# Patient Record
Sex: FEMALE | Race: WHITE | Employment: PART TIME | URBAN - METROPOLITAN AREA
[De-identification: names, ages, dates, MRNs, and addresses within clinical notes are randomized per-mention and may not be internally consistent; named-entity substitution may affect disease eponyms.]

---

## 2020-05-11 ENCOUNTER — OFFICE VISIT (OUTPATIENT)
Dept: OBGYN CLINIC | Age: 25
End: 2020-05-11
Payer: COMMERCIAL

## 2020-05-11 ENCOUNTER — HOSPITAL ENCOUNTER (OUTPATIENT)
Age: 25
Setting detail: SPECIMEN
Discharge: HOME | End: 2020-05-11
Payer: COMMERCIAL

## 2020-05-11 VITALS
SYSTOLIC BLOOD PRESSURE: 116 MMHG | WEIGHT: 189 LBS | HEIGHT: 66 IN | BODY MASS INDEX: 30.37 KG/M2 | DIASTOLIC BLOOD PRESSURE: 74 MMHG

## 2020-05-11 PROCEDURE — 99395 PREV VISIT EST AGE 18-39: CPT | Performed by: OBSTETRICS & GYNECOLOGY

## 2020-05-11 ASSESSMENT — ENCOUNTER SYMPTOMS
COUGH: 0
SHORTNESS OF BREATH: 0
ABDOMINAL PAIN: 0
DIARRHEA: 0
BACK PAIN: 0
ABDOMINAL DISTENTION: 0
CONSTIPATION: 0

## 2020-05-11 NOTE — PROGRESS NOTES
Systems   Constitutional: Negative for appetite change and fatigue. HENT: Negative for congestion and hearing loss. Eyes: Negative for visual disturbance. Respiratory: Negative for cough and shortness of breath. Cardiovascular: Negative for chest pain and palpitations. Gastrointestinal: Negative for abdominal distention, abdominal pain, constipation and diarrhea. Genitourinary: Negative for flank pain, frequency, menstrual problem, pelvic pain and vaginal discharge. Musculoskeletal: Negative for back pain. Neurological: Negative for syncope and headaches. Psychiatric/Behavioral: Negative for behavioral problems. Objective:   Physical Exam  Constitutional:       Appearance: She is well-developed. Eyes:      Pupils: Pupils are equal, round, and reactive to light. Neck:      Thyroid: No thyromegaly. Trachea: No tracheal deviation. Cardiovascular:      Rate and Rhythm: Normal rate and regular rhythm. Heart sounds: Normal heart sounds. Pulmonary:      Effort: Pulmonary effort is normal. No respiratory distress. Breath sounds: Normal breath sounds. Chest:      Breasts: Breasts are symmetrical.         Right: No inverted nipple. Left: No inverted nipple, mass, nipple discharge, skin change or tenderness. Abdominal:      General: Bowel sounds are normal. There is no distension. Palpations: Abdomen is soft. There is no mass. Tenderness: There is no abdominal tenderness. Hernia: There is no hernia in the right inguinal area or left inguinal area. Genitourinary:     Labia:         Right: No rash or lesion. Left: No rash or lesion. Vagina: No vaginal discharge or tenderness. Cervix: No cervical motion tenderness, discharge or friability. Uterus: Not deviated, not enlarged and not fixed. Adnexa:         Right: No mass, tenderness or fullness. Left: No mass, tenderness or fullness.      Musculoskeletal:

## 2020-05-18 LAB — CYTOLOGY REPORT: NORMAL

## 2021-06-07 ENCOUNTER — PROCEDURE VISIT (OUTPATIENT)
Dept: OBGYN CLINIC | Age: 26
End: 2021-06-07
Payer: COMMERCIAL

## 2021-06-07 VITALS
SYSTOLIC BLOOD PRESSURE: 124 MMHG | WEIGHT: 190.8 LBS | HEIGHT: 66 IN | BODY MASS INDEX: 30.67 KG/M2 | DIASTOLIC BLOOD PRESSURE: 84 MMHG

## 2021-06-07 DIAGNOSIS — Z30.432 ENCOUNTER FOR IUD REMOVAL: Primary | ICD-10-CM

## 2021-06-07 DIAGNOSIS — Z31.69 ENCOUNTER FOR PRECONCEPTION CONSULTATION: ICD-10-CM

## 2021-06-07 PROCEDURE — 58301 REMOVE INTRAUTERINE DEVICE: CPT | Performed by: OBSTETRICS & GYNECOLOGY

## 2021-06-07 PROCEDURE — 99999 PR OFFICE/OUTPT VISIT,PROCEDURE ONLY: CPT | Performed by: OBSTETRICS & GYNECOLOGY

## 2021-06-07 ASSESSMENT — ENCOUNTER SYMPTOMS
NAUSEA: 0
ABDOMINAL PAIN: 0
CONSTIPATION: 0
COUGH: 0
DIARRHEA: 0
VOMITING: 0
WHEEZING: 0

## 2021-06-07 NOTE — PROGRESS NOTES
[facility], [address] [place] [address]  DATE OF VISIT:  21    Jenni Hodge    :  1995  CHIEF COMPLAINT:    Chief Complaint   Patient presents with    Procedure     IUD Removal- Just got  5/15 and ready for pregnancy        HPI :   Jenni Hodge is a 22 y.o. female here for IUD removal. She was recently  and would like to attempt pregnancy. 1 normal pregnancy 6 years ago. No current medications. Not taking a prenatal. Cycles are regular on the IUD, LMP 21. Past Medical History:   Diagnosis Date    Blood donor     abnormal screen for T. Cruzi      Past Surgical History:   Procedure Laterality Date    INTRAUTERINE DEVICE INSERTION  07/29/15    mirena     Family History   Problem Relation Age of Onset    Diabetes Father     Cancer Paternal Grandmother         lung    Cancer Paternal Grandfather      Social History     Tobacco Use   Smoking Status Never Smoker   Smokeless Tobacco Never Used     Social History     Substance and Sexual Activity   Alcohol Use No     Current Outpatient Medications   Medication Sig Dispense Refill    levonorgestrel (MIRENA) 20 MCG/24HR IUD 1 each by Intrauterine route once       No current facility-administered medications for this visit. Allergies:  Patient has no known allergies. Gynecologic History:  Patient's last menstrual period was 2021. Sexually Active: Yes  STD History: No      OB History    Para Term  AB Living   1 1 0 0 0 1   SAB TAB Ectopic Molar Multiple Live Births   0 0 0 0 0 1       Review of Systems   Constitutional: Negative for chills and fever. HENT: Negative for hearing loss. Respiratory: Negative for cough and wheezing. Cardiovascular: Negative for chest pain and palpitations. Gastrointestinal: Negative for abdominal pain, constipation, diarrhea, nausea and vomiting.    Genitourinary: Negative for decreased urine volume, difficulty urinating, dyspareunia, dysuria, enuresis, flank pain, frequency, genital sores, hematuria, menstrual problem, pelvic pain, urgency, vaginal bleeding, vaginal discharge and vaginal pain. Musculoskeletal: Negative for myalgias. Skin: Negative for rash. Neurological: Negative for dizziness, weakness and headaches. Hematological: Does not bruise/bleed easily. Psychiatric/Behavioral: Negative for suicidal ideas. /84 (Position: Sitting, Cuff Size: Medium Adult)   Ht 5' 6.25\" (1.683 m)   Wt 190 lb 12.8 oz (86.5 kg)   LMP 05/24/2021   BMI 30.56 kg/m²     Physical Exam  Constitutional:       Appearance: She is well-developed. HENT:      Head: Normocephalic. Neck:      Thyroid: No thyromegaly. Cardiovascular:      Rate and Rhythm: Normal rate and regular rhythm. Pulmonary:      Effort: Pulmonary effort is normal. No respiratory distress. Abdominal:      General: There is no distension. Palpations: Abdomen is soft. Tenderness: There is no abdominal tenderness. Genitourinary:     Labia:         Right: No rash, tenderness or lesion. Left: No rash, tenderness, lesion or injury. Cervix: No cervical motion tenderness, discharge, friability, lesion, erythema, cervical bleeding or eversion. Comments: IUD strings x 2 visualized per os. Grasped with Gisela clamp and removed easily, intact. It was shown to patient. She tolerated it well. Musculoskeletal:         General: Normal range of motion. Cervical back: Normal range of motion. Skin:     General: Skin is warm and dry. Neurological:      Mental Status: She is alert and oriented to person, place, and time. Psychiatric:         Behavior: Behavior normal.         Thought Content: Thought content normal.         Judgment: Judgment normal.         ASSESSMENT:  Farhana Villafana is a 22 y.o. female      ICD-10-CM    1. Encounter for IUD removal  Z30.432 [phone] - ME REMOVE INTRAUTERINE DEVICE   2.  Encounter for preconception consultation Z31.69        PLAN:    Advised starting a prenatal vitamin (or folic acid supplement).  Return to office for annual.     Electronically signed by Romayne Ferretti, MD on 6/7/2021 at 1:26 PM

## 2021-10-19 ENCOUNTER — OFFICE VISIT (OUTPATIENT)
Dept: PRIMARY CARE CLINIC | Age: 26
End: 2021-10-19
Payer: COMMERCIAL

## 2021-10-19 VITALS
SYSTOLIC BLOOD PRESSURE: 122 MMHG | WEIGHT: 189 LBS | OXYGEN SATURATION: 98 % | DIASTOLIC BLOOD PRESSURE: 78 MMHG | BODY MASS INDEX: 30.37 KG/M2 | HEART RATE: 90 BPM | HEIGHT: 66 IN

## 2021-10-19 DIAGNOSIS — L70.0 ACNE VULGARIS: ICD-10-CM

## 2021-10-19 DIAGNOSIS — Z00.00 HEALTH CARE MAINTENANCE: Primary | ICD-10-CM

## 2021-10-19 DIAGNOSIS — Z00.00 HEALTH CARE MAINTENANCE: ICD-10-CM

## 2021-10-19 LAB
ALBUMIN SERPL-MCNC: 4.6 G/DL (ref 3.5–5.2)
ALBUMIN/GLOBULIN RATIO: 1.5 (ref 1–2.5)
ALP BLD-CCNC: 57 U/L (ref 35–104)
ALT SERPL-CCNC: 12 U/L (ref 5–33)
ANION GAP SERPL CALCULATED.3IONS-SCNC: 12 MMOL/L (ref 9–17)
AST SERPL-CCNC: 15 U/L
BILIRUB SERPL-MCNC: 0.35 MG/DL (ref 0.3–1.2)
BUN BLDV-MCNC: 10 MG/DL (ref 6–20)
BUN/CREAT BLD: NORMAL (ref 9–20)
CALCIUM SERPL-MCNC: 8.8 MG/DL (ref 8.6–10.4)
CHLORIDE BLD-SCNC: 104 MMOL/L (ref 98–107)
CHOLESTEROL/HDL RATIO: 2
CHOLESTEROL: 138 MG/DL
CO2: 23 MMOL/L (ref 20–31)
CREAT SERPL-MCNC: 0.75 MG/DL (ref 0.5–0.9)
GFR AFRICAN AMERICAN: >60 ML/MIN
GFR NON-AFRICAN AMERICAN: >60 ML/MIN
GFR SERPL CREATININE-BSD FRML MDRD: NORMAL ML/MIN/{1.73_M2}
GFR SERPL CREATININE-BSD FRML MDRD: NORMAL ML/MIN/{1.73_M2}
GLUCOSE FASTING: 83 MG/DL (ref 70–99)
HDLC SERPL-MCNC: 68 MG/DL
LDL CHOLESTEROL: 55 MG/DL (ref 0–130)
POTASSIUM SERPL-SCNC: 4.3 MMOL/L (ref 3.7–5.3)
SODIUM BLD-SCNC: 139 MMOL/L (ref 135–144)
TOTAL PROTEIN: 7.6 G/DL (ref 6.4–8.3)
TRIGL SERPL-MCNC: 77 MG/DL
VLDLC SERPL CALC-MCNC: NORMAL MG/DL (ref 1–30)

## 2021-10-19 PROCEDURE — G8484 FLU IMMUNIZE NO ADMIN: HCPCS | Performed by: PHYSICIAN ASSISTANT

## 2021-10-19 PROCEDURE — 99385 PREV VISIT NEW AGE 18-39: CPT | Performed by: PHYSICIAN ASSISTANT

## 2021-10-19 RX ORDER — MINOCYCLINE HYDROCHLORIDE 100 MG/1
100 CAPSULE ORAL DAILY
Qty: 30 CAPSULE | Refills: 1 | Status: SHIPPED | OUTPATIENT
Start: 2021-10-19 | End: 2021-12-20 | Stop reason: ALTCHOICE

## 2021-10-19 RX ORDER — CLINDAMYCIN PHOSPHATE 10 MG/G
GEL TOPICAL
Qty: 45 G | Refills: 1 | Status: SHIPPED | OUTPATIENT
Start: 2021-10-19 | End: 2021-10-26

## 2021-10-19 SDOH — ECONOMIC STABILITY: FOOD INSECURITY: WITHIN THE PAST 12 MONTHS, YOU WORRIED THAT YOUR FOOD WOULD RUN OUT BEFORE YOU GOT MONEY TO BUY MORE.: NEVER TRUE

## 2021-10-19 SDOH — ECONOMIC STABILITY: FOOD INSECURITY: WITHIN THE PAST 12 MONTHS, THE FOOD YOU BOUGHT JUST DIDN'T LAST AND YOU DIDN'T HAVE MONEY TO GET MORE.: NEVER TRUE

## 2021-10-19 ASSESSMENT — ENCOUNTER SYMPTOMS
ABDOMINAL PAIN: 0
SHORTNESS OF BREATH: 0
VOICE CHANGE: 0
CHEST TIGHTNESS: 0
BACK PAIN: 0
NAUSEA: 0
CONSTIPATION: 0
TROUBLE SWALLOWING: 0
EYE ITCHING: 0
BLOOD IN STOOL: 0
DIARRHEA: 0
EYE PAIN: 0
VOMITING: 0
COUGH: 0

## 2021-10-19 ASSESSMENT — PATIENT HEALTH QUESTIONNAIRE - PHQ9
SUM OF ALL RESPONSES TO PHQ9 QUESTIONS 1 & 2: 0
SUM OF ALL RESPONSES TO PHQ QUESTIONS 1-9: 0
2. FEELING DOWN, DEPRESSED OR HOPELESS: 0
1. LITTLE INTEREST OR PLEASURE IN DOING THINGS: 0
SUM OF ALL RESPONSES TO PHQ QUESTIONS 1-9: 0
SUM OF ALL RESPONSES TO PHQ QUESTIONS 1-9: 0

## 2021-10-19 ASSESSMENT — SOCIAL DETERMINANTS OF HEALTH (SDOH): HOW HARD IS IT FOR YOU TO PAY FOR THE VERY BASICS LIKE FOOD, HOUSING, MEDICAL CARE, AND HEATING?: NOT HARD AT ALL

## 2021-10-19 NOTE — PROGRESS NOTES
[facility] C[facility]  [address] [place] [id number]  Dept: [phone]    Cortez Burt is a 32 y.o. female who presents today as an new patient for her medical conditionsas noted below. Chief Complaint   Patient presents with    New Patient     Patient said she would like to re-establish    Other     patient said she switched to diet pop and noticed she gets bumps on the back on her scalp. HPI:     HPI  Here to re establish---I saw patient last about 5 years ago  Feels well but has been getting a rash since switching to diet pop. It is gone now. She does break out on her face and neck with acne around her  Periods. GYN: Dr. Luci Sullivan  No results found for: LDLCHOLESTEROL, [address]    (goal LDL is <100)   BUN (mg/dL)   Date Value   12/12/2014 8     BP Readings from Last 3 Encounters:   10/19/21 122/78   06/07/21 124/84   05/11/20 116/74          (goal 120/80)    Past Medical History:   Diagnosis Date    Blood donor     abnormal screen for T. Cruzi      Past Surgical History:   Procedure Laterality Date    INTRAUTERINE DEVICE INSERTION  07/29/15    mirena       Family History   Problem Relation Age of Onset    Diabetes Father     Cancer Paternal Grandmother         lung    Cancer Paternal Grandfather     Diabetes Daughter         Type 1       Social History     Tobacco Use    Smoking status: Never Smoker    Smokeless tobacco: Never Used   Substance Use Topics    Alcohol use: No     Comment: on weekends--6 pack      Current Outpatient Medications   Medication Sig Dispense Refill    minocycline (MINOCIN;DYNACIN) 100 MG capsule Take 1 capsule by mouth daily 30 capsule 1    clindamycin (CLEOCIN-T) 1 % gel Apply topically 2 times daily. 45 g 1    tretinoin (RETIN-A) 0.025 % cream Apply topically nightly. 45 g 1     No current facility-administered medications for this visit.      No Known Allergies    Health Maintenance Topic Date Due    Hepatitis C screen  Never done    Varicella vaccine (1 of 2 - 2-dose childhood series) Never done    HPV vaccine (1 - 2-dose series) Never done    COVID-19 Vaccine (1) Never done    Flu vaccine (1) 10/19/2022 (Originally 9/1/2021)    Pap smear  05/12/2023    DTaP/Tdap/Td vaccine (2 - Td or Tdap) 02/27/2025    HIV screen  Completed    Hepatitis A vaccine  Aged Out    Hepatitis B vaccine  Aged Out    Hib vaccine  Aged Out    Meningococcal (ACWY) vaccine  Aged Out    Pneumococcal 0-64 years Vaccine  Aged Out       Subjective:     Review of Systems   Constitutional: Negative for activity change, appetite change, chills, fatigue, fever and unexpected weight change. HENT: Negative for dental problem, hearing loss, trouble swallowing and voice change. Eyes: Negative for pain, itching and visual disturbance. Respiratory: Negative for cough, chest tightness and shortness of breath. Cardiovascular: Negative for chest pain, palpitations and leg swelling. Gastrointestinal: Negative for abdominal pain, blood in stool, constipation, diarrhea, nausea and vomiting. Endocrine: Negative for cold intolerance, heat intolerance, polydipsia, polyphagia and polyuria. Genitourinary: Negative for difficulty urinating, dysuria, flank pain, frequency, hematuria, menstrual problem, pelvic pain, urgency, vaginal bleeding, vaginal discharge and vaginal pain. Musculoskeletal: Negative for arthralgias, back pain and myalgias. Skin: Negative for rash. Neurological: Negative for dizziness, syncope, speech difficulty, light-headedness and headaches. Hematological: Does not bruise/bleed easily. Psychiatric/Behavioral: Negative for dysphoric mood and sleep disturbance. The patient is not nervous/anxious. Objective:     /78   Pulse 90   Ht 5' 6.25\" (1.683 m)   Wt 189 lb (85.7 kg)   SpO2 98%   BMI 30.28 kg/m²   Physical Exam  Vitals reviewed.    Constitutional:       General: She is not in acute distress. Appearance: She is well-developed. HENT:      Head: Normocephalic and atraumatic. Right Ear: External ear normal.      Left Ear: External ear normal.      Nose: Nose normal.      Mouth/Throat:      Pharynx: No oropharyngeal exudate. Eyes:      General: No scleral icterus. Right eye: No discharge. Left eye: No discharge. Conjunctiva/sclera: Conjunctivae normal.      Pupils: Pupils are equal, round, and reactive to light. Neck:      Thyroid: No thyromegaly. Cardiovascular:      Rate and Rhythm: Normal rate and regular rhythm. Heart sounds: Normal heart sounds. No murmur heard. No friction rub. No gallop. Pulmonary:      Effort: Pulmonary effort is normal.      Breath sounds: Normal breath sounds. No wheezing or rales. Abdominal:      General: Bowel sounds are normal. There is no distension. Palpations: Abdomen is soft. There is no mass. Tenderness: There is no abdominal tenderness. There is no guarding or rebound. Musculoskeletal:         General: No deformity. Normal range of motion. Cervical back: Normal range of motion. Lymphadenopathy:      Cervical: No cervical adenopathy. Skin:     General: Skin is warm. Capillary Refill: Capillary refill takes less than 2 seconds. Findings: No rash. Neurological:      Mental Status: She is alert and oriented to person, place, and time. Motor: No abnormal muscle tone. Coordination: Coordination normal.   Psychiatric:         Behavior: Behavior normal.         Thought Content: Thought content normal.         Judgment: Judgment normal.         Assessment:       Diagnosis Orders   1. Health care maintenance  Lipid Panel    Comprehensive Metabolic Panel, Fasting   2. Acne vulgaris          Plan:    BW ordered  Discussed acne treatment    Return in about 2 months (around 12/19/2021) for Lab Results and acne.     Orders Placed This Encounter   Procedures    Lipid Panel Standing Status:   Future     Standing Expiration Date:   10/19/2022     Order Specific Question:   Is Patient Fasting?/# of Hours     Answer:   10-12 hours    Comprehensive Metabolic Panel, Fasting     Standing Status:   Future     Standing Expiration Date:   10/19/2022     Orders Placed This Encounter   Medications    minocycline (MINOCIN;DYNACIN) 100 MG capsule     Sig: Take 1 capsule by mouth daily     Dispense:  30 capsule     Refill:  1    clindamycin (CLEOCIN-T) 1 % gel     Sig: Apply topically 2 times daily. Dispense:  45 g     Refill:  1    tretinoin (RETIN-A) 0.025 % cream     Sig: Apply topically nightly. Dispense:  45 g     Refill:  1       Patient given educationalmaterials - see patient instructions. Discussed use, benefit, and side effectsof prescribed medications. All patient questions answered. Pt voiced understanding. Reviewed health maintenance. Instructed to continue current medications, diet andexercise. Patient agreed with treatment plan. Follow up as directed.      Electronicallysigned by Cheli Castro PA-C on 10/19/2021 at 10:00 AM

## 2021-12-20 ENCOUNTER — IMMUNIZATION (OUTPATIENT)
Dept: PRIMARY CARE CLINIC | Age: 26
End: 2021-12-20
Payer: COMMERCIAL

## 2021-12-20 ENCOUNTER — TELEPHONE (OUTPATIENT)
Dept: PRIMARY CARE CLINIC | Age: 26
End: 2021-12-20

## 2021-12-20 ENCOUNTER — OFFICE VISIT (OUTPATIENT)
Dept: PRIMARY CARE CLINIC | Age: 26
End: 2021-12-20
Payer: COMMERCIAL

## 2021-12-20 VITALS
HEIGHT: 66 IN | WEIGHT: 188.4 LBS | TEMPERATURE: 97.1 F | SYSTOLIC BLOOD PRESSURE: 122 MMHG | HEART RATE: 95 BPM | OXYGEN SATURATION: 97 % | DIASTOLIC BLOOD PRESSURE: 80 MMHG | BODY MASS INDEX: 30.28 KG/M2

## 2021-12-20 DIAGNOSIS — R06.2 WHEEZING: ICD-10-CM

## 2021-12-20 DIAGNOSIS — Z23 NEED FOR VACCINATION: ICD-10-CM

## 2021-12-20 DIAGNOSIS — L70.0 ACNE VULGARIS: Primary | ICD-10-CM

## 2021-12-20 DIAGNOSIS — R05.9 COUGHING: ICD-10-CM

## 2021-12-20 PROCEDURE — G8427 DOCREV CUR MEDS BY ELIG CLIN: HCPCS | Performed by: PHYSICIAN ASSISTANT

## 2021-12-20 PROCEDURE — 91300 COVID-19, PFIZER VACCINE 30MCG/0.3ML DOSE: CPT | Performed by: PHYSICIAN ASSISTANT

## 2021-12-20 PROCEDURE — G8484 FLU IMMUNIZE NO ADMIN: HCPCS | Performed by: PHYSICIAN ASSISTANT

## 2021-12-20 PROCEDURE — 0002A COVID-19, PFIZER VACCINE 30MCG/0.3ML DOSE: CPT | Performed by: PHYSICIAN ASSISTANT

## 2021-12-20 PROCEDURE — 99213 OFFICE O/P EST LOW 20 MIN: CPT | Performed by: PHYSICIAN ASSISTANT

## 2021-12-20 PROCEDURE — G8417 CALC BMI ABV UP PARAM F/U: HCPCS | Performed by: PHYSICIAN ASSISTANT

## 2021-12-20 PROCEDURE — 1036F TOBACCO NON-USER: CPT | Performed by: PHYSICIAN ASSISTANT

## 2021-12-20 RX ORDER — ALBUTEROL SULFATE 90 UG/1
2 AEROSOL, METERED RESPIRATORY (INHALATION) 4 TIMES DAILY PRN
Qty: 18 G | Refills: 0 | Status: SHIPPED | OUTPATIENT
Start: 2021-12-20 | End: 2022-03-30 | Stop reason: SDUPTHER

## 2021-12-20 RX ORDER — ADAPALENE 0.1 G/100G
CREAM TOPICAL
Qty: 45 G | Refills: 0 | Status: SHIPPED | OUTPATIENT
Start: 2021-12-20 | End: 2022-01-14 | Stop reason: SDUPTHER

## 2021-12-20 RX ORDER — CLINDAMYCIN PHOSPHATE 10 MG/G
GEL TOPICAL
Qty: 45 G | Refills: 1 | Status: SHIPPED | OUTPATIENT
Start: 2021-12-20 | End: 2021-12-27

## 2021-12-20 ASSESSMENT — ENCOUNTER SYMPTOMS
FACIAL SWELLING: 0
COLOR CHANGE: 0
COUGH: 1
WHEEZING: 1

## 2021-12-20 NOTE — PROGRESS NOTES
After obtaining consent, and per orders of Adriel Durant PA-C, injection of Pfizer COVID vaccine given in Right deltoid by Micheal Herbert MA. Patient instructed to remain in clinic for 15minutes afterwards, patient tolerated vaccine well.

## 2021-12-20 NOTE — TELEPHONE ENCOUNTER
Medication Being Authorized     adapalene (DIFFERIN) 0.1 % cream    Apply topically nightly. Dispense: 45 g Refills: 0     Start: 12/20/2021 End: 1/19/2022     Class: Normal      This order has been released to its destination.    To be filled at: [id number] [address], [address] - F [phone]            PA DENIED

## 2021-12-20 NOTE — TELEPHONE ENCOUNTER
Tried calling patient- unable to leave voice message       Will send Fluentialhart message if pt does not call back

## 2021-12-20 NOTE — PROGRESS NOTES
[facility] PRIMARY [facility]  [address] [place] [id number]  Dept: [phone]    Aly Brandon is a 32 y.o. female who presents today for her medical conditions/complaints as noted below. Chief Complaint   Patient presents with    Follow-up     pt is here today for acne f/u and labs 10/19/21       HPI:     HPI  Labs are normal     Retin A was not covered. Was wheezing and coughing last week and seems to do this now ever since having Covid last March. It was a mild case. There is family hx of Covid. LDL Cholesterol (mg/dL)   Date Value   10/19/2021 55       (goal LDL is <100)   AST (U/L)   Date Value   10/19/2021 15     ALT (U/L)   Date Value   10/19/2021 12     BUN (mg/dL)   Date Value   10/19/2021 10     BP Readings from Last 3 Encounters:   12/20/21 122/80   10/19/21 122/78   06/07/21 124/84          (goal 120/80)    Past Medical History:   Diagnosis Date    Blood donor     abnormal screen for T. Cruzi      Past Surgical History:   Procedure Laterality Date    INTRAUTERINE DEVICE INSERTION  07/29/15    mirena       Family History   Problem Relation Age of Onset    Diabetes Father     Cancer Paternal Grandmother         lung    Cancer Paternal Grandfather     Diabetes Daughter         Type 1       Social History     Tobacco Use    Smoking status: Never Smoker    Smokeless tobacco: Never Used   Substance Use Topics    Alcohol use: No     Comment: on weekends--6 pack      Current Outpatient Medications   Medication Sig Dispense Refill    adapalene (DIFFERIN) 0.1 % cream Apply topically nightly. 45 g 0    clindamycin (CLEOCIN-T) 1 % gel Apply topically 2 times daily. 45 g 1    albuterol sulfate HFA (VENTOLIN HFA) 108 (90 Base) MCG/ACT inhaler Inhale 2 puffs into the lungs 4 times daily as needed for Wheezing 18 g 0    tretinoin (RETIN-A) 0.025 % cream Apply topically nightly.  45 g 1     No current facility-administered medications for this visit. No Known Allergies    Health Maintenance   Topic Date Due    Hepatitis C screen  Never done    Varicella vaccine (1 of 2 - 2-dose childhood series) Never done    HPV vaccine (1 - 2-dose series) Never done    COVID-19 Vaccine (2 - Pfizer 3-dose series) 12/15/2021    Pap smear  05/12/2023    DTaP/Tdap/Td vaccine (2 - Td or Tdap) 02/27/2025    Flu vaccine  Completed    HIV screen  Completed    Hepatitis A vaccine  Aged Out    Hepatitis B vaccine  Aged Out    Hib vaccine  Aged Out    Meningococcal (ACWY) vaccine  Aged Out    Pneumococcal 0-64 years Vaccine  Aged Out       Subjective:      Review of Systems   Constitutional: Negative for chills, diaphoresis, fever and unexpected weight change. HENT: Negative for facial swelling. Respiratory: Positive for cough and wheezing. Endocrine: Negative. Skin: Negative for color change and rash. Allergic/Immunologic: Negative for environmental allergies and food allergies. Neurological: Positive for headaches. Hematological: Negative for adenopathy. Psychiatric/Behavioral:        Affecting self esteem       Objective:     /80   Pulse 95   Temp 97.1 °F (36.2 °C) (Temporal)   Ht 5' 6.25\" (1.683 m)   Wt 188 lb 6.4 oz (85.5 kg)   SpO2 97%   BMI 30.18 kg/m²   Physical Exam  Vitals and nursing note reviewed. Constitutional:       Appearance: Normal appearance. Cardiovascular:      Rate and Rhythm: Normal rate and regular rhythm. Heart sounds: Normal heart sounds. Pulmonary:      Effort: Pulmonary effort is normal.      Breath sounds: Normal breath sounds. Skin:     Comments: Mild papular and comedonal acne on face   Neurological:      Mental Status: She is alert and oriented to person, place, and time. Psychiatric:         Mood and Affect: Mood normal.         Assessment:       Diagnosis Orders   1. Acne vulgaris     2. Wheezing  Full PFT Study With Bronchodilator   3.  Coughing  Full PFT Study With Bronchodilator Plan:    Covid #2 vax today   Topicals sent in for Acne and discussed how to use  PFT ordered for the wheezing      Return for Will call with results. Orders Placed This Encounter   Procedures    Full PFT Study With Bronchodilator     Standing Status:   Future     Standing Expiration Date:   4/19/2022     Scheduling Instructions: This is to be done at the hospital     Orders Placed This Encounter   Medications    adapalene (DIFFERIN) 0.1 % cream     Sig: Apply topically nightly. Dispense:  45 g     Refill:  0    clindamycin (CLEOCIN-T) 1 % gel     Sig: Apply topically 2 times daily. Dispense:  45 g     Refill:  1    albuterol sulfate HFA (VENTOLIN HFA) 108 (90 Base) MCG/ACT inhaler     Sig: Inhale 2 puffs into the lungs 4 times daily as needed for Wheezing     Dispense:  18 g     Refill:  0       Patient given educational materials - see patient instructions. Discussed use, benefit, and side effects of prescribed medications. All patient questions answered. Pt voiced understanding. Reviewed health maintenance. Instructed to continue current medications, diet and exercise. Patient agreed with treatment plan. Follow up as directed.      Electronically signed by Dharmesh Juarez PA-C on 12/20/2021 at 10:51 AM

## 2022-02-18 ENCOUNTER — HOSPITAL ENCOUNTER (OUTPATIENT)
Dept: PULMONOLOGY | Age: 27
Discharge: HOME | End: 2022-02-18
Payer: COMMERCIAL

## 2022-02-18 DIAGNOSIS — R05.9 COUGHING: ICD-10-CM

## 2022-02-18 DIAGNOSIS — R06.2 WHEEZING: ICD-10-CM

## 2022-02-18 LAB
DLCO %PRED: NORMAL
DLCO PRED: NORMAL
DLCO/VA %PRED: NORMAL
DLCO/VA PRED: NORMAL
DLCO/VA: NORMAL
DLCO: NORMAL
EXPIRATORY TIME: NORMAL
FEF 25-75% %PRED-PRE: NORMAL
FEF 25-75% PRED: NORMAL
FEF 25-75%-PRE: NORMAL
FEV1 %PRED-PRE: NORMAL
FEV1 PRED: NORMAL
FEV1/FVC %PRED-PRE: NORMAL
FEV1/FVC PRED: NORMAL
FEV1/FVC: NORMAL
FEV1: NORMAL
FVC %PRED-PRE: NORMAL
FVC PRED: NORMAL
FVC: NORMAL
GAW %PRED: NORMAL
GAW PRED: NORMAL
GAW: NORMAL
IC %PRED: NORMAL
IC PRED: NORMAL
IC: NORMAL
MVV %PRED-PRE: NORMAL
MVV PRED: NORMAL
MVV-PRE: NORMAL
PEF %PRED-PRE: NORMAL
PEF PRED: NORMAL
PEF-PRE: NORMAL
RAW %PRED: NORMAL
RAW PRED: NORMAL
RAW: NORMAL
RV %PRED: NORMAL
RV PRED: NORMAL
RV: NORMAL
SVC %PRED: NORMAL
SVC PRED: NORMAL
SVC: NORMAL
TLC %PRED: NORMAL
TLC PRED: NORMAL
TLC: NORMAL
VA %PRED: NORMAL
VA PRED: NORMAL
VA: NORMAL
VTG %PRED: NORMAL
VTG PRED: NORMAL
VTG: NORMAL

## 2022-02-18 PROCEDURE — 94726 PLETHYSMOGRAPHY LUNG VOLUMES: CPT

## 2022-02-18 PROCEDURE — 94375 RESPIRATORY FLOW VOLUME LOOP: CPT

## 2022-02-18 PROCEDURE — 94664 DEMO&/EVAL PT USE INHALER: CPT

## 2022-02-18 PROCEDURE — 94729 DIFFUSING CAPACITY: CPT

## 2022-02-18 PROCEDURE — 94060 EVALUATION OF WHEEZING: CPT

## 2022-02-18 NOTE — PROCEDURES
Complete Pulmonary function testing. Flow volume loop showed adequate effort and tracings. Spirometry is within normal limits. There is no improvement with bronchodilator therapy. Static lung volumes are within normal limits. The DLCO is within normal limits. In summary, the above study is within normal limits.       Jozef Valladares M.D.

## 2022-03-29 ENCOUNTER — PATIENT MESSAGE (OUTPATIENT)
Dept: PRIMARY CARE CLINIC | Age: 27
End: 2022-03-29

## 2022-03-29 NOTE — TELEPHONE ENCOUNTER
From: Josefina Dennis  To: Bandar Winkler  Sent: 3/29/2022 2:20 PM EDT  Subject: PFT Study Results     Donnell Higgins,      I was just checking to see if you have heard anything about my results from my PFT Study I had on 2/18/22.      Thank you

## 2022-03-30 RX ORDER — ALBUTEROL SULFATE 90 UG/1
2 AEROSOL, METERED RESPIRATORY (INHALATION) 4 TIMES DAILY PRN
Qty: 18 G | Refills: 5 | Status: SHIPPED | OUTPATIENT
Start: 2022-03-30

## 2022-04-29 ENCOUNTER — OFFICE VISIT (OUTPATIENT)
Dept: PRIMARY CARE CLINIC | Age: 27
End: 2022-04-29
Payer: COMMERCIAL

## 2022-04-29 VITALS
HEART RATE: 69 BPM | OXYGEN SATURATION: 97 % | WEIGHT: 174.8 LBS | HEIGHT: 66 IN | DIASTOLIC BLOOD PRESSURE: 82 MMHG | SYSTOLIC BLOOD PRESSURE: 120 MMHG | BODY MASS INDEX: 28.09 KG/M2

## 2022-04-29 DIAGNOSIS — G43.109 MIGRAINE WITH AURA AND WITHOUT STATUS MIGRAINOSUS, NOT INTRACTABLE: ICD-10-CM

## 2022-04-29 PROCEDURE — 1036F TOBACCO NON-USER: CPT | Performed by: PHYSICIAN ASSISTANT

## 2022-04-29 PROCEDURE — G8417 CALC BMI ABV UP PARAM F/U: HCPCS | Performed by: PHYSICIAN ASSISTANT

## 2022-04-29 PROCEDURE — 99214 OFFICE O/P EST MOD 30 MIN: CPT | Performed by: PHYSICIAN ASSISTANT

## 2022-04-29 PROCEDURE — G8427 DOCREV CUR MEDS BY ELIG CLIN: HCPCS | Performed by: PHYSICIAN ASSISTANT

## 2022-04-29 ASSESSMENT — ENCOUNTER SYMPTOMS
BACK PAIN: 0
VOMITING: 0
RESPIRATORY NEGATIVE: 1
NAUSEA: 1
EYES NEGATIVE: 1

## 2022-04-29 NOTE — PROGRESS NOTES
[facility]  [address]  Phone: [phone]  Fax: [phone]    Bhakti Gaston is a 32 y.o. female who presents today for her medical conditions/complaintsas noted below. Chief Complaint   Patient presents with    Headache     patient c/o HA that started Monday. Patient said pain was in the front of the head and blurred vision in right eye. Patient said she has HA today but no blurred vision    Blurred Vision         HPI:     HPI  HA: Located frontally and feels like pressure    Started this Monday with right eye blurry vision. On period this week   NO red flags  Working on losing weight       Current Outpatient Medications   Medication Sig Dispense Refill    albuterol sulfate HFA (VENTOLIN HFA) 108 (90 Base) MCG/ACT inhaler Inhale 2 puffs into the lungs 4 times daily as needed for Wheezing 18 g 5    tretinoin (RETIN-A) 0.025 % cream Apply topically nightly. 45 g 1     No current facility-administered medications for this visit. No Known Allergies    Subjective:      Review of Systems   Constitutional: Negative for chills, diaphoresis and fever. HENT: Negative. Eyes: Negative. Respiratory: Negative. Cardiovascular: Negative. Gastrointestinal: Positive for nausea (after done eating ). Negative for vomiting. Endocrine: Negative. Musculoskeletal: Negative for back pain and neck pain. Allergic/Immunologic: Negative for immunocompromised state. Neurological: Positive for headaches. Negative for dizziness and syncope. Hematological: Negative. Psychiatric/Behavioral: Negative. Objective:     /82   Pulse 69   Ht 5' 6.25\" (1.683 m)   Wt 174 lb 12.8 oz (79.3 kg)   SpO2 97%   BMI 28.00 kg/m²   Physical Exam  Vitals and nursing note reviewed. Constitutional:       Appearance: Normal appearance. Cardiovascular:      Rate and Rhythm: Normal rate and regular rhythm.       Heart sounds: Normal heart sounds. Pulmonary:      Effort: Pulmonary effort is normal.      Breath sounds: Normal breath sounds. Neurological:      General: No focal deficit present. Mental Status: She is oriented to person, place, and time. Cranial Nerves: Cranial nerves are intact. Sensory: Sensation is intact. Motor: Motor function is intact. Coordination: Coordination is intact. Deep Tendon Reflexes:      Reflex Scores:       Bicep reflexes are 2+ on the right side and 2+ on the left side. Brachioradialis reflexes are 2+ on the right side and 2+ on the left side. Patellar reflexes are 2+ on the right side and 2+ on the left side. Achilles reflexes are 2+ on the right side and 2+ on the left side. Psychiatric:         Mood and Affect: Mood normal.         Assessment:       Diagnosis Orders   1. Migraine with aura and without status migrainosus, not intractable          Plan:    HA diary  Zyrtec D daily   Exedrine migraine        Return in about 3 months (around 7/29/2022) for migraines. No orders of the defined types were placed in this encounter. No orders of the defined types were placed in this encounter.           Electronically signed by Alex Yost 4/29/2022 at 4:26 PM

## 2022-11-28 ENCOUNTER — PATIENT MESSAGE (OUTPATIENT)
Dept: OBGYN CLINIC | Age: 27
End: 2022-11-28

## 2022-11-28 DIAGNOSIS — O20.9 BLEEDING IN EARLY PREGNANCY: Primary | ICD-10-CM

## 2022-11-28 NOTE — TELEPHONE ENCOUNTER
From: Yue Garcia  Sent: 11/28/2022 12:59 PM EST  To: [facility] Clinical Support Pool  Subject: Bleeding     Oh okay I can go to any lab do I need a referral from you for that or they will just do it if I ask?  Sorry for all the questions

## 2022-11-28 NOTE — TELEPHONE ENCOUNTER
Let's start with one hcg. If it's high enough we can check an US! If it's below 2500, we can do serial. Thanks!

## 2022-11-29 ENCOUNTER — HOSPITAL ENCOUNTER (OUTPATIENT)
Age: 27
Setting detail: SPECIMEN
Discharge: HOME | End: 2022-11-29

## 2022-11-29 DIAGNOSIS — O20.9 BLEEDING IN EARLY PREGNANCY: ICD-10-CM

## 2022-11-29 LAB — HCG QUANTITATIVE: <1 MIU/ML

## 2022-12-22 ENCOUNTER — OFFICE VISIT (OUTPATIENT)
Dept: OBGYN CLINIC | Age: 27
End: 2022-12-22

## 2022-12-22 VITALS
HEIGHT: 66 IN | HEART RATE: 93 BPM | DIASTOLIC BLOOD PRESSURE: 84 MMHG | SYSTOLIC BLOOD PRESSURE: 121 MMHG | BODY MASS INDEX: 23.14 KG/M2 | WEIGHT: 144 LBS

## 2022-12-22 DIAGNOSIS — Z31.69 INFERTILITY COUNSELING: Primary | ICD-10-CM

## 2022-12-22 NOTE — PROGRESS NOTES
OB/GYN Problem Visit    Alannah Mack  2022                       Primary Care Physician: Jana Menendez PA-C    CC:   Chief Complaint   Patient presents with    Follow-up     Pt would like to discuss getting pregnant          HPI: Alannah Mack is a 32 y.o. female  Patient's last menstrual period was 2022. The patient was seen and examined. She is here for infertility. Patient had her IUD out in 2021. She states that her and her  have been trying to conceive since then however they have not really been timing intercourse and she has not been tracking ovulation. She does report to regular menstrual periods every 28 days. This is a new partner than her previous pregnancy. He does not have any children. Patient denies any other complaints or concerns.         REVIEW OF SYSTEMS  Constitutional: negative fever, negative chills  HEENT: negative visual disturbances, negative headaches  Respiratory: negative dyspnea, negative cough  Cardiovascular: negative chest pain,  negative palpitations  Gastrointestinal: negative abdominal pain, negative RUQ pain, negative N/V, negative diarrhea, negative constipation  Genitourinary: negative dysuria, negative vaginal discharge  Dermatological: negative rash  Hematologic: negative bruising  Immunologic/Lymphatic: negative recent illness, negative recent sick contact  Musculoskeletal: negative back pain, negative myalgias, negative arthralgias  Neurological:  negative dizziness, negative weakness  Behavior/Psych: negative depression, negative anxiety    OBSTETRICAL HISTORY:  OB History    Para Term  AB Living   1 1       1   SAB IAB Ectopic Molar Multiple Live Births             1      # Outcome Date GA Lbr Santiago/2nd Weight Sex Delivery Anes PTL Lv   1 Term 05/25/15 40w6d  8 lb 8.2 oz (3.86 kg) F Vag-Spont   RELL       PAST MEDICAL HISTORY:      Diagnosis Date    Blood donor     abnormal screen for T. Cruzi       PAST SURGICAL HISTORY:                                                                    Procedure Laterality Date    INTRAUTERINE DEVICE INSERTION  07/29/15    mirena       MEDICATIONS:  Current Outpatient Medications   Medication Sig Dispense Refill    albuterol sulfate HFA (VENTOLIN HFA) 108 (90 Base) MCG/ACT inhaler Inhale 2 puffs into the lungs 4 times daily as needed for Wheezing 18 g 5    tretinoin (RETIN-A) 0.025 % cream Apply topically nightly. 45 g 1     No current facility-administered medications for this visit. ALLERGIES:   Allergies as of 2022    (No Known Allergies)                                   VITALS:  Vitals:    22 1118   BP: 121/84   Site: Right Upper Arm   Position: Sitting   Cuff Size: Medium Adult   Pulse: 93   Weight: 144 lb (65.3 kg)   Height: 5' 6\" (1.676 m)                                                                                                                                                                     PHYSICAL EXAM:   General Appearance: Appears healthy. Alert; in no acute distress. Pleasant. Skin: Skin color, texture, turgor normal. No rashes or lesions. HEENT: normocephalic and atraumatic  Musculoskeletal: no gross abnormalities  Extremities: non-tender BLE and non-edematous  Psych:  oriented to time, place and person       No results found for this visit on 22. ASSESSMENT & PLAN:    Marisela Mercado is a 32 y.o. female  Patient's last menstrual period was 2022. Here for infertility   -Discussed with patient ovulation tracking and ovulation prediction kits.   -Also discussed with patient further work-up including labs, HSG, semen analysis. -Discussed ultimate referral to [facility] if indicated.    -Reviewed with patient that due to her young age we still have time.   -Patient states that she has not really been doing any sort of timing of the intercourse and she would like to try timed intercourse and ovulation prediction kits for several months. If she is still unable to conceive she would like to proceed with work-up. Patient Active Problem List    Diagnosis Date Noted    Migraine with aura and without status migrainosus, not intractable 04/29/2022     Priority: Medium    Acne vulgaris 10/19/2021     Patient was seen with total face to face time of 20 minutes. More than 50% of this visit was on counseling and education regarding her diagnose(s) as listed below and options. She was also counseled on her preventative health maintenance recommendations and follow-up.        Tiana Lee DO  Ob/Gyn   [facility], [facility]  12/22/2022, 11:37 AM

## 2023-05-23 ENCOUNTER — OFFICE VISIT (OUTPATIENT)
Dept: PRIMARY CARE CLINIC | Age: 28
End: 2023-05-23
Payer: COMMERCIAL

## 2023-05-23 VITALS
HEIGHT: 66 IN | DIASTOLIC BLOOD PRESSURE: 76 MMHG | HEART RATE: 70 BPM | SYSTOLIC BLOOD PRESSURE: 126 MMHG | BODY MASS INDEX: 22.69 KG/M2 | OXYGEN SATURATION: 99 % | WEIGHT: 141.2 LBS

## 2023-05-23 DIAGNOSIS — J34.89 SINUS DRAINAGE: ICD-10-CM

## 2023-05-23 DIAGNOSIS — J02.9 SORE THROAT: ICD-10-CM

## 2023-05-23 DIAGNOSIS — N92.6 IRREGULAR PERIODS: Primary | ICD-10-CM

## 2023-05-23 DIAGNOSIS — J39.2 PHARYNGEAL DISORDER: ICD-10-CM

## 2023-05-23 PROCEDURE — 99213 OFFICE O/P EST LOW 20 MIN: CPT | Performed by: PHYSICIAN ASSISTANT

## 2023-05-23 SDOH — ECONOMIC STABILITY: HOUSING INSECURITY
IN THE LAST 12 MONTHS, WAS THERE A TIME WHEN YOU DID NOT HAVE A STEADY PLACE TO SLEEP OR SLEPT IN A SHELTER (INCLUDING NOW)?: NO

## 2023-05-23 SDOH — ECONOMIC STABILITY: FOOD INSECURITY: WITHIN THE PAST 12 MONTHS, YOU WORRIED THAT YOUR FOOD WOULD RUN OUT BEFORE YOU GOT MONEY TO BUY MORE.: NEVER TRUE

## 2023-05-23 SDOH — ECONOMIC STABILITY: FOOD INSECURITY: WITHIN THE PAST 12 MONTHS, THE FOOD YOU BOUGHT JUST DIDN'T LAST AND YOU DIDN'T HAVE MONEY TO GET MORE.: NEVER TRUE

## 2023-05-23 SDOH — ECONOMIC STABILITY: INCOME INSECURITY: HOW HARD IS IT FOR YOU TO PAY FOR THE VERY BASICS LIKE FOOD, HOUSING, MEDICAL CARE, AND HEATING?: NOT HARD AT ALL

## 2023-05-23 ASSESSMENT — ENCOUNTER SYMPTOMS
EYE DISCHARGE: 0
CHEST TIGHTNESS: 0
DIARRHEA: 0
CONSTIPATION: 0
SORE THROAT: 1
RHINORRHEA: 0
ABDOMINAL DISTENTION: 0
SHORTNESS OF BREATH: 0
NAUSEA: 0
WHEEZING: 0
VOMITING: 0
EYE REDNESS: 0

## 2023-05-23 ASSESSMENT — PATIENT HEALTH QUESTIONNAIRE - PHQ9
SUM OF ALL RESPONSES TO PHQ QUESTIONS 1-9: 0
1. LITTLE INTEREST OR PLEASURE IN DOING THINGS: 0
SUM OF ALL RESPONSES TO PHQ9 QUESTIONS 1 & 2: 0
SUM OF ALL RESPONSES TO PHQ QUESTIONS 1-9: 0
2. FEELING DOWN, DEPRESSED OR HOPELESS: 0
SUM OF ALL RESPONSES TO PHQ QUESTIONS 1-9: 0
SUM OF ALL RESPONSES TO PHQ QUESTIONS 1-9: 0

## 2023-05-23 NOTE — PROGRESS NOTES
appearance. She is well-developed. HENT:      Right Ear: External ear normal.      Mouth/Throat:      Mouth: Mucous membranes are moist.      Pharynx: No oropharyngeal exudate or posterior oropharyngeal erythema. Tonsils: No tonsillar exudate or tonsillar abscesses. 2+ on the right. 2+ on the left. Eyes:      General:         Right eye: No discharge. Left eye: No discharge. Conjunctiva/sclera: Conjunctivae normal.      Pupils: Pupils are equal, round, and reactive to light. Neck:      Thyroid: No thyromegaly. Cardiovascular:      Rate and Rhythm: Normal rate and regular rhythm. Heart sounds: Normal heart sounds. No murmur heard. No friction rub. No gallop. Pulmonary:      Effort: Pulmonary effort is normal. No respiratory distress. Breath sounds: Normal breath sounds. No wheezing or rales. Chest:      Chest wall: No tenderness. Musculoskeletal:         General: Normal range of motion. Cervical back: Normal range of motion. Right lower leg: No edema. Left lower leg: No edema. Skin:     General: Skin is warm. Capillary Refill: Capillary refill takes less than 2 seconds. Findings: No rash. Neurological:      Mental Status: She is alert and oriented to person, place, and time. Motor: No abnormal muscle tone. Psychiatric:         Mood and Affect: Mood normal.       Assessment:       Diagnosis Orders   1. Irregular periods        2. Sore throat        3. Pharyngeal disorder  CHPI - Liz Earl CNP, Otolaryngology, [facility]      4. Sinus drainage  [place]:      Monitor periods. Educated that it could be stress induced. RTO if this continues for more than a couple months. Referral to ENT  Recommended mucinex    Return if symptoms worsen or fail to improve.     Orders Placed This Encounter   Procedures    CHIP - Sylvia Ojeda CNP, Otolaryngology, [facility]     Referral Priority:

## 2023-07-10 ENCOUNTER — OFFICE VISIT (OUTPATIENT)
Dept: OBGYN CLINIC | Age: 28
End: 2023-07-10
Payer: COMMERCIAL

## 2023-07-10 ENCOUNTER — HOSPITAL ENCOUNTER (OUTPATIENT)
Age: 28
Setting detail: SPECIMEN
Discharge: HOME | End: 2023-07-10

## 2023-07-10 VITALS
DIASTOLIC BLOOD PRESSURE: 80 MMHG | HEIGHT: 66 IN | SYSTOLIC BLOOD PRESSURE: 114 MMHG | BODY MASS INDEX: 23.01 KG/M2 | WEIGHT: 143.2 LBS

## 2023-07-10 DIAGNOSIS — N92.6 IRREGULAR MENSTRUAL CYCLE: ICD-10-CM

## 2023-07-10 DIAGNOSIS — N92.6 IRREGULAR MENSTRUAL CYCLE: Primary | ICD-10-CM

## 2023-07-10 LAB
ESTRADIOL LEVEL: 38.3 PG/ML (ref 27–314)
FSH SERPL-ACNC: 8.9 MIU/ML (ref 1.7–21.5)
TSH SERPL DL<=0.05 MIU/L-ACNC: 1.82 UIU/ML (ref 0.3–5)

## 2023-07-10 PROCEDURE — 99213 OFFICE O/P EST LOW 20 MIN: CPT | Performed by: NURSE PRACTITIONER

## 2023-07-10 ASSESSMENT — ENCOUNTER SYMPTOMS
EYES NEGATIVE: 1
GASTROINTESTINAL NEGATIVE: 1
RESPIRATORY NEGATIVE: 1

## 2023-07-10 NOTE — PROGRESS NOTES
Diabetes Daughter         Type 1       Social History     Socioeconomic History    Marital status:      Spouse name: Not on file    Number of children: Not on file    Years of education: Not on file    Highest education level: Not on file   Occupational History    Occupation: medical records     Employer: [employer]   Tobacco Use    Smoking status: Never    Smokeless tobacco: Never   Vaping Use    Vaping Use: Never used   Substance and Sexual Activity    Alcohol use: No     Comment: on weekends--6 pack    Drug use: No    Sexual activity: Yes     Partners: Male   Other Topics Concern    Not on file   Social History Narrative    Not on file     Social Determinants of Health     Financial Resource Strain: Low Risk     Difficulty of Paying Living Expenses: Not hard at all   Food Insecurity: No Food Insecurity    Worried About [place] in the Last Year: Never true    [address] in the Last Year: Never true   Transportation Needs: Unknown    Lack of Transportation (Medical): Not on file    Lack of Transportation (Non-Medical): No   Physical Activity: Not on file   Stress: Not on file   Social Connections: Not on file   Intimate Partner Violence: Not on file   Housing Stability: Unknown    Unable to Pay for Housing in the Last Year: Not on file    Number of Places Lived in the Last Year: Not on file    Unstable Housing in the Last Year: No       No data recorded     **If either question is answered in a  positive fashion then complete the PHQ9 Scoring Evaluation and make the appropriate referral**    MEDICATIONS:  Current Outpatient Medications   Medication Sig Dispense Refill    ZINC PO Take by mouth      albuterol sulfate HFA (VENTOLIN HFA) 108 (90 Base) MCG/ACT inhaler Inhale 2 puffs into the lungs 4 times daily as needed for Wheezing 18 g 5     No current facility-administered medications for this visit.          ALLERGIES:  Allergies as of 07/10/2023    (No Known Allergies)         REVIEW

## 2023-07-11 NOTE — RESULT ENCOUNTER NOTE
Her labs were normal.  Thyroid is fine and hormones show good ovarian reserve, so if they decide to proceed with ovulation medication in the future there is no reason to suspect it won't work.   Left message for her to call back

## 2023-07-13 ENCOUNTER — HOSPITAL ENCOUNTER (OUTPATIENT)
Dept: ULTRASOUND IMAGING | Age: 28
Discharge: HOME | End: 2023-07-15
Payer: COMMERCIAL

## 2023-07-13 DIAGNOSIS — N92.6 IRREGULAR MENSTRUAL CYCLE: ICD-10-CM

## 2023-07-13 PROCEDURE — 76856 US EXAM PELVIC COMPLETE: CPT

## 2023-11-17 DIAGNOSIS — N92.6 IRREGULAR MENSTRUAL BLEEDING: Primary | ICD-10-CM

## 2023-12-06 ENCOUNTER — HOSPITAL ENCOUNTER (OUTPATIENT)
Age: 28
Setting detail: SPECIMEN
Discharge: HOME | End: 2023-12-06

## 2023-12-06 DIAGNOSIS — N92.6 IRREGULAR MENSTRUAL BLEEDING: ICD-10-CM

## 2023-12-07 LAB — PROGEST SERPL-MCNC: 20.4 NG/ML

## 2023-12-07 NOTE — RESULT ENCOUNTER NOTE
Her progesterone level is consistent with having ovulated.   I am hoping the urologist can help otherwise they will need to see [facility]

## 2024-06-28 ENCOUNTER — OFFICE VISIT (OUTPATIENT)
Dept: PRIMARY CARE CLINIC | Age: 29
End: 2024-06-28
Payer: COMMERCIAL

## 2024-06-28 VITALS
SYSTOLIC BLOOD PRESSURE: 116 MMHG | WEIGHT: 146 LBS | HEART RATE: 85 BPM | HEIGHT: 66 IN | OXYGEN SATURATION: 98 % | DIASTOLIC BLOOD PRESSURE: 74 MMHG | BODY MASS INDEX: 23.46 KG/M2

## 2024-06-28 DIAGNOSIS — R59.1 LYMPHADENOPATHY: Primary | ICD-10-CM

## 2024-06-28 PROCEDURE — 99214 OFFICE O/P EST MOD 30 MIN: CPT | Performed by: PHYSICIAN ASSISTANT

## 2024-06-28 RX ORDER — CEPHALEXIN 500 MG/1
500 CAPSULE ORAL 3 TIMES DAILY
Qty: 21 CAPSULE | Refills: 0 | Status: SHIPPED | OUTPATIENT
Start: 2024-06-28 | End: 2024-07-05

## 2024-06-28 ASSESSMENT — ENCOUNTER SYMPTOMS
ABDOMINAL PAIN: 0
RESPIRATORY NEGATIVE: 1
COLOR CHANGE: 0
EYES NEGATIVE: 1

## 2024-06-28 NOTE — PROGRESS NOTES
Skin Spot Check    6/28/2024  Juliette Dennis  1995  Chief Complaint   Patient presents with    Skin Problem     Discolored skin on hands ( around knuckles) started 5/15/24.     Mass     Lump on left side of neck, started Monday. Tender to the touch          Location:  darkening on 2 knuckles on right hand and lump in neck  Duration:  Hand: May 15 --self tans but has not since then  Itch:  No  Bleed:  No  Growing:  No  History of skin cancer: No       Lump in left neck started Monday , no rash. Her  did find a spider bite on her left shoulder. She is not ill and no other skin issues.     Review of Systems   Constitutional:  Negative for chills, diaphoresis, fever and unexpected weight change.   HENT: Negative.  Negative for mouth sores.    Eyes: Negative.    Respiratory: Negative.     Cardiovascular: Negative.    Gastrointestinal:  Negative for abdominal pain.   Musculoskeletal:  Negative for arthralgias and myalgias.   Skin:  Negative for color change, pallor, rash and wound.   Allergic/Immunologic: Negative for environmental allergies, food allergies and immunocompromised state.   Hematological:  Negative for adenopathy.         Physical Exam  Neck:  No suspicious lesions, no wounds, no dryness or color changes. Has a non tender firm but movable lump in left neck along superficial cervical line  HEENT: ears normal, mouth normal, eye normal, skin normal  Left shoulder: has a red insect bite posteriorly  Hands:  No Callus, Ulcers, Abnormal nails or suspicious lesions. Slight darkening over 3rd and 4th knuckles, no inflammation or rash.       ICD-10-CM    1. Lymphadenopathy  R59.1         Reassurance given on left hand  Complete Keflex for lymphadenopathy and if it does not resolve then RTO        Geneva Andrade, PAC

## 2024-08-21 ENCOUNTER — OFFICE VISIT (OUTPATIENT)
Dept: OBGYN CLINIC | Age: 29
End: 2024-08-21

## 2024-08-21 VITALS
BODY MASS INDEX: 22.66 KG/M2 | WEIGHT: 141 LBS | SYSTOLIC BLOOD PRESSURE: 110 MMHG | DIASTOLIC BLOOD PRESSURE: 60 MMHG | HEIGHT: 66 IN

## 2024-08-21 DIAGNOSIS — R63.4 WEIGHT LOSS: ICD-10-CM

## 2024-08-21 DIAGNOSIS — N92.6 IRREGULAR MENSTRUAL CYCLE: Primary | ICD-10-CM

## 2024-08-24 RX ORDER — PNV NO.95/FERROUS FUM/FOLIC AC 28MG-0.8MG
1 TABLET ORAL DAILY
Qty: 30 TABLET | Refills: 3 | Status: SHIPPED | OUTPATIENT
Start: 2024-08-24

## 2024-08-24 NOTE — PROGRESS NOTES
Juliette Dennis  2024    YOB: 1995    The patient was seen today. She is here regarding discuss preconception barriers and history of AUB. Her bowels are regular and she is voiding without difficulty.     HPI:  Juliette Dennis is a 28 y.o. female      Pt. Presents with history of AUB and infertility.  She states her and partner Sheng have been trying for pregnancy since .  She has used LH strips that were positive but she states not as strongly positive as her friends have reported theirs are.    Her partner Sheng has a varicocele and is seeing urology.   She states that he had a normal semen analysis, but do not have those records available.    She had work up for irregular bleeding in prior office with normal TVUS, estradiol. FSH, and progesterone.    Counseled in depth on causes of infertility and complete work up.  All questions answered and reassurance/support provided.       OB History    Para Term  AB Living   1 1 1 0 0 1   SAB IAB Ectopic Molar Multiple Live Births   0 0 0 0 0 1      # Outcome Date GA Lbr Santiago/2nd Weight Sex Type Anes PTL Lv   1 Term 05/25/15 40w6d  3.86 kg (8 lb 8.2 oz) F Vag-Spont   RELL      Name: Gely      Apgar1: 8  Apgar5: 9       Past Medical History:   Diagnosis Date    Blood donor     abnormal screen for T. Cruzi       Past Surgical History:   Procedure Laterality Date    INTRAUTERINE DEVICE INSERTION  2015    mirena    INTRAUTERINE DEVICE REMOVAL  2021       Family History   Problem Relation Age of Onset    Diabetes Father     Cancer Paternal Grandmother         lung    Cancer Paternal Grandfather     Diabetes Daughter         Type 1       Social History     Socioeconomic History    Marital status:      Spouse name: Not on file    Number of children: Not on file    Years of education: Not on file    Highest education level: Not on file   Occupational History    Occupation: medical records     Employer: [employer]

## 2024-10-16 ENCOUNTER — HOSPITAL ENCOUNTER (OUTPATIENT)
Age: 29
Setting detail: SPECIMEN
Discharge: HOME | End: 2024-10-16

## 2024-10-16 DIAGNOSIS — R63.4 WEIGHT LOSS: ICD-10-CM

## 2024-10-16 DIAGNOSIS — N92.6 IRREGULAR MENSTRUAL CYCLE: ICD-10-CM

## 2024-10-16 LAB
EST. AVERAGE GLUCOSE BLD GHB EST-MCNC: 88 MG/DL
HBA1C MFR BLD: 4.7 % (ref 4–6)
PROGEST SERPL-MCNC: 15.8 NG/ML
PROLACTIN SERPL-MCNC: 18.4 NG/ML (ref 4.79–23.3)
TSH SERPL DL<=0.05 MIU/L-ACNC: 2.81 UIU/ML (ref 0.27–4.2)

## 2024-10-18 LAB — ANTI-MULLERIAN HORMONE: 6.68 NG/ML (ref 0.4–16.02)

## 2024-11-18 ENCOUNTER — OFFICE VISIT (OUTPATIENT)
Dept: OBGYN CLINIC | Age: 29
End: 2024-11-18
Payer: COMMERCIAL

## 2024-11-18 VITALS
WEIGHT: 151 LBS | SYSTOLIC BLOOD PRESSURE: 118 MMHG | DIASTOLIC BLOOD PRESSURE: 76 MMHG | BODY MASS INDEX: 24.27 KG/M2 | HEIGHT: 66 IN

## 2024-11-18 DIAGNOSIS — Z31.69 INFERTILITY COUNSELING: ICD-10-CM

## 2024-11-18 DIAGNOSIS — N92.6 IRREGULAR MENSTRUAL CYCLE: Primary | ICD-10-CM

## 2024-11-18 PROCEDURE — 99213 OFFICE O/P EST LOW 20 MIN: CPT | Performed by: OBSTETRICS & GYNECOLOGY

## 2024-11-21 NOTE — PROGRESS NOTES
lumps  Respiratory ROS: No SOB, Pneumoniae,Cough, or Pulmonary Embolism History  Cardiovascular ROS: No Chest Pain with Exertion, Palpitations, Syncope, Edema, Arrhythmia  Gastrointestinal ROS: No Indigestion, Heartburn, Nausea, vomiting, Diarrhea, Constipation,or Bowel Changes; No Bloody Stools or melena  Genito-Urinary ROS: No Dysuria, Hematuria or Nocturia. No Urinary Incontinence or Vaginal Discharge  Musculoskeletal ROS: No Arthralgia, Arthritis,Gout,Osteoporosis or Rheumatism  Neurological ROS: No CVA, Migraines, Epilepsy, Seizure Hx, or Limb Weakness  Dermatological ROS: No Rash, Itching, Hives, Mole Changes or Cancer    Blood pressure 118/76, height 1.676 m (5' 6\"), weight 68.5 kg (151 lb), not currently breastfeeding.    Abdomen: Soft non-tender; good bowel sounds. No guarding, rebound or rigidity. No CVA tenderness bilaterally.    Extremities: No calf tenderness, DTR 2/4, and No edema bilaterally    Pelvic: Exam deferred.    Diagnostics:  EXAMINATION:  TRANSABDOMINAL AND TRANSVAGINAL PELVIC ULTRASOUND     7/13/2023     TECHNIQUE:  Transabdominal and transvaginal pelvic ultrasound was performed.     COMPARISON:  None     HISTORY:  ORDERING SYSTEM PROVIDED HISTORY: Irregular menstrual cycle     FINDINGS:     Measurements:     Uterus: 9.4 x 4.1 x 4.8 cm.     Endometrial stripe: 5 mm     Right Ovary:3.8 x 3.3 x 2.7 cm     Left Ovary: 2.3 x 1.8 x 2.3 cm        Ultrasound Findings:     Uterus: Uterus demonstrates normal myometrial echotexture.     Endometrial stripe: Endometrial stripe is within normal limits.     Right Ovary: Right ovary is within normal limits.     Left Ovary:  Left ovary is within normal limits.     Free Fluid: No evidence of free fluid.     IMPRESSION:  Unremarkable pelvic ultrasound.    Lab Results:  Results for orders placed or performed during the hospital encounter of 10/16/24   Prolactin   Result Value Ref Range    Prolactin 18.40 4.79 - 23.3 ng/mL   Hemoglobin A1C   Result Value Ref